# Patient Record
Sex: FEMALE | Race: WHITE | NOT HISPANIC OR LATINO | URBAN - METROPOLITAN AREA
[De-identification: names, ages, dates, MRNs, and addresses within clinical notes are randomized per-mention and may not be internally consistent; named-entity substitution may affect disease eponyms.]

---

## 2019-10-04 ENCOUNTER — OUTPATIENT (OUTPATIENT)
Dept: OUTPATIENT SERVICES | Facility: HOSPITAL | Age: 59
LOS: 1 days | End: 2019-10-04
Payer: COMMERCIAL

## 2019-10-04 DIAGNOSIS — R13.10 DYSPHAGIA, UNSPECIFIED: ICD-10-CM

## 2019-10-04 PROCEDURE — 74230 X-RAY XM SWLNG FUNCJ C+: CPT | Mod: 26

## 2019-10-04 PROCEDURE — 92611 MOTION FLUOROSCOPY/SWALLOW: CPT | Mod: GN

## 2019-10-04 PROCEDURE — 74230 X-RAY XM SWLNG FUNCJ C+: CPT

## 2019-10-04 NOTE — SWALLOW VFSS/MBS ASSESSMENT ADULT - ADDITIONAL RECOMMENDATIONS
1) SUGGEST A REGULAR TEXTURE DIET WITH THIN LIQUID CONSISTENCIES AS THE PATIENT TOLERATES THE SAME FROM AN  OROPHARYNGEAL SWALLOWING PERSPECTIVE ON EXAM.     2) NO NEED FOR SWALLOWING THERAPY BY SPEECH PATHOLOGY.     30 RECONSULT PRN. 1) SUGGEST A REGULAR TEXTURE DIET WITH THIN LIQUID CONSISTENCIES AS THE PATIENT TOLERATES THE SAME FROM AN  OROPHARYNGEAL SWALLOWING PERSPECTIVE ON EXAM.     2) NO NEED FOR SWALLOWING THERAPY BY SPEECH PATHOLOGY.     3) RECONSULT PRN.

## 2019-10-04 NOTE — SWALLOW VFSS/MBS ASSESSMENT ADULT - ORAL PHASE COMMENTS
Bolus formation/transfer were achieved via functional AP lingual actions and functional rotary masticatory motions. She was able to clear oral debris after age acceptable piecemeal deglutition.

## 2019-10-04 NOTE — SWALLOW VFSS/MBS ASSESSMENT ADULT - DIAGNOSTIC IMPRESSIONS
THE PATIENT DEMONSTRATES FUNCTIONAL OROPHARYNGEAL SWALLOWING ABILITIES FOR AGE. NO ASPIRATION, LARYNGEAL PENETRATION, PHARYNGEAL RETENTION OR LARYNGOPHARYNGEAL REFLUX WERE DEMONSTRATED UNDER FLUOROSCOPIC CONTROL.

## 2019-10-04 NOTE — SWALLOW VFSS/MBS ASSESSMENT ADULT - COMMENTS
The patient was referred for a Modified Barium Swallowing Study(MBS) by Dr Collier. The patient stated that she is on a regular texture diet without a liquids consistency restriction. She stated that she avoids coarse meats as she feels as though they get stuck in her throat at times. She also stated that she feels as though she has to carefully chew her food and she also often frequently alternates drinking fluids with eating more coarse foods. Of note is that the patient denied any medical problems though she did report previously undergoing a colonoscopy and developing pneumonia after the procedure warranting a hospitalization.

## 2019-10-04 NOTE — SWALLOW VFSS/MBS ASSESSMENT ADULT - ORAL PREP COMMENTS
The patient was oriented to feeding situ, readily accepted PO and demonstrated functional labial grading on utensils. No dribbling was noted.

## 2019-10-04 NOTE — SWALLOW VFSS/MBS ASSESSMENT ADULT - PHARYNGEAL PHASE COMMENTS
Swallow was triggered in an acceptable time frame for age. Hyolaryngeal excursion and epiglottic retroflexion during the swallow were within functional parameters for age with adequate prandial airway protection. Pharyngeal motility was functional for age as well. Cricopharyngeal sphincter opening was functional for age as well/unremarkable. NO ASPIRATION, LARYNGEAL PENETRATION, PHARYNGEAL RETENTION OR LARYNGOPHARYNGEAL REFLUX WERE DEMONSTRATED UNDER FLUOROSCOPIC CONTROL. Swallow was triggered in an acceptable time frame for age. Hyolaryngeal excursion and epiglottic retroflexion during the swallow were within functional parameters for age with adequate prandial airway protection. Pharyngeal motility was functional for age as well. Cricopharyngeal sphincter opening was also functional for age/unremarkable. NO ASPIRATION, LARYNGEAL PENETRATION, PHARYNGEAL RETENTION OR LARYNGOPHARYNGEAL REFLUX WERE DEMONSTRATED UNDER FLUOROSCOPIC CONTROL.

## 2019-10-05 DIAGNOSIS — R13.10 DYSPHAGIA, UNSPECIFIED: ICD-10-CM
